# Patient Record
Sex: MALE | Race: WHITE | Employment: FULL TIME | URBAN - METROPOLITAN AREA
[De-identification: names, ages, dates, MRNs, and addresses within clinical notes are randomized per-mention and may not be internally consistent; named-entity substitution may affect disease eponyms.]

---

## 2020-01-08 ENCOUNTER — THERAPY VISIT (OUTPATIENT)
Dept: PHYSICAL THERAPY | Facility: CLINIC | Age: 50
End: 2020-01-08
Payer: COMMERCIAL

## 2020-01-08 DIAGNOSIS — M25.521 RIGHT ELBOW PAIN: ICD-10-CM

## 2020-01-08 DIAGNOSIS — S52.124A NONDISPLACED FRACTURE OF HEAD OF RIGHT RADIUS, INITIAL ENCOUNTER FOR CLOSED FRACTURE: ICD-10-CM

## 2020-01-08 PROCEDURE — 97110 THERAPEUTIC EXERCISES: CPT | Mod: GP | Performed by: PHYSICAL THERAPIST

## 2020-01-08 PROCEDURE — 97161 PT EVAL LOW COMPLEX 20 MIN: CPT | Mod: GP | Performed by: PHYSICAL THERAPIST

## 2020-01-08 NOTE — PROGRESS NOTES
[facility] Initial Evaluation  Subjective:  Patient is referred s/p R radial head fracture which resulted from a fall on 11-4-19. He used a sling for a short period of time. He has noted gradual improvement in pain and ROM. Pushing over head is limited and painful.    The history is provided by the patient.   Type of problem:  Right elbow   Condition occurred with:  A fall. This is a new condition    Patient reports pain:  Lateral, lateral epicondyle and radial. Radiates to:  Wrist. Associated symptoms:  Loss of motion/stiffness and loss of strength. Symptoms are exacerbated by activity, carrying and other (pushing) and relieved by activity/movement.    Teo Vidal being seen for right radial head fracture.   Problem began 11/4/2019. Where condition occurred: in the community.  and reported as 1/10 on pain scale. General health as reported by patient is good.       Current medications:  None.   Primary job tasks include:  Computer work and prolonged sitting.  Pain is described as aching and is intermittent. Pain is worse during the day. Since onset symptoms are gradually improving. Special tests:  X-ray.      Restrictions include:  Working in normal job without restrictions.    Barriers include:  None as reported by patient.  Red flags:  None as reported by patient.                      Objective:  System                        ROM:  AROM:    Hyperextension Elbow: Left:  4    Flexion Elbow:  Left:150   Right:144  Extension Elbow:  Right: 4      Supination Elbow/Wrist:  Left: 80Right: 70  Pronation Elbow/Wrist:  Left: 70    Right: 70            Strength:    Flexion Elbow:  Left: 5/5 Pain:    Right: 5/5 Pain:  Extension Elbow: Left: 5/5 Pain:    Right: 4/5  Pain:+  Flexion Wrist:  Left: 5/5 Pain:    Right: 5/5 Pain:  Extension Wrist: Left: 5/5 Pain:  Right: 5/5 Pain:  Supination Elbow/Wrist: Left: 5/5 Pain:    Right: 4+/5  Pain:+  Pronation Elbow/Wrist: Left: 5/5 Pain:        Right: 5/5  Pain:  Radial Deviation:  Left: 5/5 Pain:    Right: 5/5 Pain:  Ulnar Deviation: Left: 5/5 Pain:    Right: 5/5 Pain::  Dominance: Right   Left: 107#      Right: 65#  Ligament Testing:not assessed        Special Testing:  not assessed      Palpation:  not assessed      Mobility:    Proximal Radioulnar:  Left: normal   Right:  Hypomobile  Distal Radioulnar:  Left:  Normal  Right:  Hypomobile                                      General     ROS    Assessment/Plan:    Patient is a 49 year old male with right side elbow complaints.    Patient has the following significant findings with corresponding treatment plan.                Diagnosis 1:  S/p R radial head fracture  Pain -  hot/cold therapy, manual therapy, self management, education and home program  Decreased joint mobility - manual therapy, therapeutic exercise and home program  Decreased strength - therapeutic exercise, therapeutic activities and home program  Impaired muscle performance - neuro re-education and home program  Decreased function - therapeutic activities and home program    Therapy Evaluation Codes:   1) History comprised of:   Personal factors that impact the plan of care:      None.    Comorbidity factors that impact the plan of care are:      None.     Medications impacting care: None.  2) Examination of Body Systems comprised of:   Body structures and functions that impact the plan of care:      Elbow.   Activity limitations that impact the plan of care are:      Grasping, Lifting and pushing.  3) Clinical presentation characteristics are:   Stable/Uncomplicated.  4) Decision-Making    Low complexity using standardized patient assessment instrument and/or measureable assessment of functional outcome.  Cumulative Therapy Evaluation is: Low complexity.    Previous and current functional limitations:  (See Goal Flow Sheet for this information)    Short term and Long term goals: (See Goal Flow Sheet for this information)      Communication ability:  Patient appears to be able to clearly communicate and understand verbal and written communication and follow directions correctly.  Treatment Explanation - The following has been discussed with the patient:   RX ordered/plan of care  Anticipated outcomes  Possible risks and side effects  This patient would benefit from PT intervention to resume normal activities.   Rehab potential is excellent.    Frequency:  1 X week, once daily  Duration:  for 4 weeks  Discharge Plan:  Achieve all LTG.  Independent in home treatment program.  Reach maximal therapeutic benefit.    Please refer to the daily flowsheet for treatment today, total treatment time and time spent performing 1:1 timed codes.

## 2020-01-15 ENCOUNTER — THERAPY VISIT (OUTPATIENT)
Dept: PHYSICAL THERAPY | Facility: CLINIC | Age: 50
End: 2020-01-15
Payer: COMMERCIAL

## 2020-01-15 DIAGNOSIS — M25.521 RIGHT ELBOW PAIN: ICD-10-CM

## 2020-01-15 DIAGNOSIS — S52.124A NONDISPLACED FRACTURE OF HEAD OF RIGHT RADIUS, INITIAL ENCOUNTER FOR CLOSED FRACTURE: ICD-10-CM

## 2020-01-15 PROCEDURE — 97140 MANUAL THERAPY 1/> REGIONS: CPT | Mod: GP | Performed by: PHYSICAL THERAPIST

## 2020-01-15 PROCEDURE — 97110 THERAPEUTIC EXERCISES: CPT | Mod: GP | Performed by: PHYSICAL THERAPIST

## 2020-01-22 ENCOUNTER — THERAPY VISIT (OUTPATIENT)
Dept: PHYSICAL THERAPY | Facility: CLINIC | Age: 50
End: 2020-01-22
Payer: COMMERCIAL

## 2020-01-22 DIAGNOSIS — S52.124A NONDISPLACED FRACTURE OF HEAD OF RIGHT RADIUS, INITIAL ENCOUNTER FOR CLOSED FRACTURE: ICD-10-CM

## 2020-01-22 DIAGNOSIS — M25.521 RIGHT ELBOW PAIN: ICD-10-CM

## 2020-01-22 PROCEDURE — 97110 THERAPEUTIC EXERCISES: CPT | Mod: GP | Performed by: PHYSICAL THERAPIST

## 2020-01-22 PROCEDURE — 97140 MANUAL THERAPY 1/> REGIONS: CPT | Mod: GP | Performed by: PHYSICAL THERAPIST

## 2020-02-05 ENCOUNTER — THERAPY VISIT (OUTPATIENT)
Dept: PHYSICAL THERAPY | Facility: CLINIC | Age: 50
End: 2020-02-05
Payer: COMMERCIAL

## 2020-02-05 DIAGNOSIS — M25.521 RIGHT ELBOW PAIN: ICD-10-CM

## 2020-02-05 DIAGNOSIS — S52.124A NONDISPLACED FRACTURE OF HEAD OF RIGHT RADIUS, INITIAL ENCOUNTER FOR CLOSED FRACTURE: ICD-10-CM

## 2020-02-05 PROCEDURE — 97110 THERAPEUTIC EXERCISES: CPT | Mod: GP | Performed by: PHYSICAL THERAPIST

## 2020-02-05 PROCEDURE — 97112 NEUROMUSCULAR REEDUCATION: CPT | Mod: GP | Performed by: PHYSICAL THERAPIST

## 2020-03-23 PROBLEM — S52.124A NONDISPLACED FRACTURE OF HEAD OF RIGHT RADIUS, INITIAL ENCOUNTER FOR CLOSED FRACTURE: Status: RESOLVED | Noted: 2020-01-08 | Resolved: 2020-03-23

## 2020-03-23 PROBLEM — M25.521 RIGHT ELBOW PAIN: Status: RESOLVED | Noted: 2020-01-08 | Resolved: 2020-03-23

## 2020-03-23 NOTE — PROGRESS NOTES
Discharge Note    Progress reporting period is from last progress note on   to Feb 5, 2020.    Teo failed to follow up and current status is unknown.  Please see information below for last relevant information on current status.  Patient seen for 4 visits.    SUBJECTIVE  Subjective changes noted by patient:  Doing well with minimal c/o R elbow pain  .  Current pain level is  .     Previous pain level was  1/10.   Changes in function:  Yes (See Goal flowsheet attached for changes in current functional level)  Adverse reaction to treatment or activity: None    OBJECTIVE  Changes noted in objective findings:  75# R, 95# L. AROM WNL B.     ASSESSMENT/PLAN  Diagnosis: s/p R radial head fx   Updated problem list and treatment plan:   Pain - HEP  STG/LTGs have been met or progress has been made towards goals:  Yes, please see goal flowsheet for most current information  Assessment of Progress: current status is unknown.    Last current status: Pt is progressing well   Self Management Plans:  HEP  I have re-evaluated this patient and find that the nature, scope, duration and intensity of the therapy is appropriate for the medical condition of the patient.  Teo continues to require the following intervention to meet STG and LTG's:  HEP.    Recommendations:  Discharge with current home program.  Patient to follow up with MD as needed.    Please refer to the daily flowsheet for treatment today, total treatment time and time spent performing 1:1 timed codes.

## 2020-04-09 ENCOUNTER — TELEPHONE (OUTPATIENT)
Dept: PHYSICAL THERAPY | Facility: CLINIC | Age: 50
End: 2020-04-09